# Patient Record
Sex: FEMALE | Race: AMERICAN INDIAN OR ALASKA NATIVE
[De-identification: names, ages, dates, MRNs, and addresses within clinical notes are randomized per-mention and may not be internally consistent; named-entity substitution may affect disease eponyms.]

---

## 2017-12-31 ENCOUNTER — HOSPITAL ENCOUNTER (INPATIENT)
Dept: HOSPITAL 42 - ED | Age: 37
LOS: 2 days | Discharge: HOME | DRG: 812 | End: 2018-01-02
Attending: INTERNAL MEDICINE | Admitting: INTERNAL MEDICINE
Payer: COMMERCIAL

## 2017-12-31 VITALS — BODY MASS INDEX: 25 KG/M2

## 2017-12-31 DIAGNOSIS — N92.0: ICD-10-CM

## 2017-12-31 DIAGNOSIS — D50.9: Primary | ICD-10-CM

## 2017-12-31 DIAGNOSIS — J45.909: ICD-10-CM

## 2017-12-31 DIAGNOSIS — R40.2412: ICD-10-CM

## 2017-12-31 DIAGNOSIS — K59.00: ICD-10-CM

## 2017-12-31 LAB
% IRON SATURATION: 4 % (ref 20–55)
ALBUMIN SERPL-MCNC: 4.1 G/DL (ref 3–4.8)
ALBUMIN/GLOB SERPL: 1.1 {RATIO} (ref 1.1–1.8)
ALT SERPL-CCNC: 18 U/L (ref 7–56)
APPEARANCE UR: CLEAR
APTT BLD: 28.9 SECONDS (ref 25.1–36.5)
AST SERPL-CCNC: 23 U/L (ref 14–36)
BILIRUB UR-MCNC: NEGATIVE MG/DL
BUN SERPL-MCNC: 9 MG/DL (ref 7–21)
CALCIUM SERPL-MCNC: 9.7 MG/DL (ref 8.4–10.5)
COLOR UR: YELLOW
ERYTHROCYTE [DISTWIDTH] IN BLOOD BY AUTOMATED COUNT: 20.3 % (ref 11.5–14.5)
ERYTHROCYTE [DISTWIDTH] IN BLOOD BY AUTOMATED COUNT: 23.4 % (ref 11.5–14.5)
FERRITIN SERPL-MCNC: 3.8 NG/ML
FOLATE SERPL-MCNC: 9.2 NG/ML
GFR NON-AFRICAN AMERICAN: > 60
GLUCOSE UR STRIP-MCNC: NEGATIVE MG/DL
HGB BLD-MCNC: 5.8 G/DL (ref 12–16)
HGB BLD-MCNC: 8.2 G/DL (ref 12–16)
INR PPP: 1.17 (ref 0.93–1.08)
IRON SERPL-MCNC: 17 UG/DL (ref 45–180)
LEUKOCYTE ESTERASE UR-ACNC: NEGATIVE LEU/UL
MCH RBC QN AUTO: 16.5 PG (ref 25–35)
MCH RBC QN AUTO: 19.2 PG (ref 25–35)
MCHC RBC AUTO-ENTMCNC: 26.2 G/DL (ref 31–37)
MCHC RBC AUTO-ENTMCNC: 28.5 G/DL (ref 31–37)
MCV RBC AUTO: 63 FL (ref 80–105)
MCV RBC AUTO: 67.3 FL (ref 80–105)
PH UR STRIP: 6 [PH] (ref 4.7–8)
PLATELET # BLD: 125 10^3/UL (ref 120–450)
PLATELET # BLD: 134 10^3/UL (ref 120–450)
PROT UR STRIP-MCNC: NEGATIVE MG/DL
PROTHROMBIN TIME: 12.8 SECONDS (ref 9.4–12.5)
RBC # BLD AUTO: 3.51 10^6/UL (ref 3.5–6.1)
RBC # BLD AUTO: 4.28 10^6/UL (ref 3.5–6.1)
RBC # UR STRIP: NEGATIVE /UL
SP GR UR STRIP: 1.01 (ref 1–1.03)
TIBC SERPL-MCNC: 399 UG/DL (ref 265–497)
TROPONIN I SERPL-MCNC: 0.06 NG/ML
TROPONIN I SERPL-MCNC: 0.06 NG/ML
TROPONIN I SERPL-MCNC: 0.07 NG/ML
URINE NITRATE: NEGATIVE
UROBILINOGEN UR STRIP-ACNC: 1 E.U./DL
VIT B12 SERPL-MCNC: 277 PG/ML (ref 239–931)
WBC # BLD AUTO: 4.9 10^3/UL (ref 4.5–11)
WBC # BLD AUTO: 6.2 10^3/UL (ref 4.5–11)

## 2017-12-31 PROCEDURE — 30233N1 TRANSFUSION OF NONAUTOLOGOUS RED BLOOD CELLS INTO PERIPHERAL VEIN, PERCUTANEOUS APPROACH: ICD-10-PCS | Performed by: INTERNAL MEDICINE

## 2017-12-31 RX ADMIN — VITAMIN D, TAB 1000IU (100/BT) SCH INTLU: 25 TAB at 18:39

## 2017-12-31 NOTE — ED PDOC
Arrival/HPI





- General


Chief Complaint: Chest Pain


Time Seen by Provider: 12/31/17 02:05


Historian: Patient





- History of Present Illness


Narrative History of Present Illness (Text): 


12/31/17 02:13


Darleen Mauricio is a 37 year old female, whose past medical history includes anemia

, who presents to the Emergency department complaining of chest pain tonight. 

Patient states she began experiencing chest pain radiating to her right, 

worsened with movement, tonight while lying in bed tonight. Patient reports 

associated dizziness and paresthesias to her right arm. Patient denies any 

history of tobacco abuse, fever, shortness of breath, abdominal pain, nausea, 

vomiting, diarrhea, back pain, neck pain, headache, vision changes, or any 

other complaints. 


Time/Duration: Other (tonight)


Symptom Onset: Gradual


Symptom Course: Unchanged


Activities at Onset: Light


Context: Home





Past Medical History





- Provider Review


Nursing Documentation Reviewed: Yes





- Infectious Disease


Hx of Infectious Diseases: None





- Reproductive


Menopause: No





- Cardiac


Hx Cardiac Disorders: No





- Pulmonary


Hx Respiratory Disorders: Yes


Hx Asthma: Yes





- Neurological


Hx Neurological Disorder: Yes


Hx Headaches: Yes





- HEENT


Hx HEENT Disorder: No





- Renal


Hx Renal Disorder: No





- Endocrine/Metabolic


Hx Endocrine Disorders: No





- Hematological/Oncological


Hx Blood Disorders: No





- Integumentary


Hx Dermatological Disorder: No





- Musculoskeletal/Rheumatological


Hx Musculoskeletal Disorders: No


Hx Falls: No





- Gastrointestinal


Hx Gastrointestinal Disorders: Yes


Hx Constipation: Yes





- Genitourinary/Gynecological


Hx Genitourinary Disorders: No





- Psychiatric


Hx Psychophysiologic Disorder: No


Hx Depression: No


Hx Emotional Abuse: No


Hx Physical Abuse: No


Hx Substance Use: No





- Suicidal Assessment


Feels Threatened In Home Enviroment: No





Family/Social History





- Physician Review


Nursing Documentation Reviewed: Yes


Family/Social History: Unknown Family HX


Smoking Status: Never Smoked


Hx Alcohol Use: No


Hx Substance Use: No


Hx Substance Use Treatment: No





Allergies/Home Meds


Allergies/Adverse Reactions: 


Allergies





No Known Allergies Allergy (Verified 12/31/17 01:58)


 








Home Medications: 


 Home Meds











 Medication  Instructions  Recorded  Confirmed


 


No Known Home Med  12/31/17 12/31/17














Review of Systems





- Physician Review


All systems were reviewed & negative as marked: Yes





- Review of Systems


Constitutional: Normal.  absent: Fevers


Eyes: Normal


ENT: Normal


Respiratory: Normal.  absent: SOB, Cough


Cardiovascular: Chest Pain


Gastrointestinal: Normal.  absent: Abdominal Pain


Genitourinary Female: Normal.  absent: Dysuria, Frequency, Urine Output Changes


Musculoskeletal: Other (+paresthesias).  absent: Back Pain


Skin: Normal


Neurological: Dizziness


Endocrine: Normal


Hemo/Lymphatic: Normal


Psychiatric: Normal





Physical Exam


Vital Signs Reviewed: Yes


Vital Signs











  Temp Pulse Resp BP Pulse Ox


 


 12/31/17 01:49  98.4 F  82  17  122/54 L  100











Temperature: Afebrile


Blood Pressure: Normal


Pulse: Regular


Respiratory Rate: Normal


Appearance: Positive for: Well-Appearing, Non-Toxic, Comfortable


Pain Distress: None


Mental Status: Positive for: Alert and Oriented X 3





- Systems Exam


Head: Present: Atraumatic, Normocephalic


Pupils: Present: PERRL


Extroacular Muscles: Present: EOMI


Conjunctiva: Present: Normal


Mouth: Present: Moist Mucous Membranes


Neck: Present: Normal Range of Motion


Respiratory/Chest: Present: Clear to Auscultation, Good Air Exchange.  No: 

Respiratory Distress, Accessory Muscle Use


Cardiovascular: Present: Regular Rate and Rhythm, Normal S1, S2.  No: Murmurs


Abdomen: Present: Normal Bowel Sounds.  No: Tenderness, Distention, Peritoneal 

Signs


Back: Present: Normal Inspection


Upper Extremity: Present: Normal Inspection.  No: Cyanosis, Edema


Lower Extremity: Present: Normal Inspection.  No: Edema


Neurological: Present: GCS=15, CN II-XII Intact, Speech Normal


Skin: Present: Warm, Dry, Normal Color.  No: Rashes


Psychiatric: Present: Alert, Oriented x 3, Normal Insight, Normal Concentration





Medical Decision Making


ED Course and Treatment: 


12/31/17 02:13


Impression:


37 year old female complaining of chest pain and dizziness tonight.





Plan:


-- CT Head w/o contrast


-- EKG


-- Labs, cardiac enzymes


-- Reassess and disposition





Progress Notes:


Reviewed EKG, NSR at 61 bpm. No ST-segment elevations or depressions, no T-wave 

inversions, normal intervals.





12/31/17 03:30


Labs noted, hgb: 5.8. hct: 22.1. Blood type and screen ordered, will transfuse 

pt.





12/31/17 04:40


Case discussed with Dr. Hair, who is aware and agrees with plan. Accepts pt 

in to her service. Pt will go to Telemetry observation for anemia. Requests Dr. Dunn on consult.





12/31/17 05:40


CT Head shows:


No intracranial hemorrhage.


No intracranial edema.


No evidence of infarct.


The sinuses and mastoid air cells are clear.


IMPRESSION:


No acute findings.





- Lab Interpretations


Lab Results: 








 12/31/17 02:00 





 12/31/17 02:00 





 Lab Results





12/31/17 04:19: Blood Type Pending, Antibody Screen Pending, Crossmatch See 

Detail, BBK History Checked Patient has bt


12/31/17 02:00: WBC 4.9, RBC 3.51, Hgb 5.8 L*, Hct 22.1 L, MCV 63.0 L, MCH 16.5 

L, MCHC 26.2 L, RDW 20.3 H, Plt Count 125


12/31/17 02:00: Sodium 137, Potassium 3.7, Chloride 103, Carbon Dioxide 24, 

Anion Gap 14, BUN 9, Creatinine 0.7, Est GFR (African Amer) > 60, Est GFR (Non-

Af Amer) > 60, Random Glucose 102, Calcium 9.7, Total Bilirubin 0.3, AST 23, 

ALT 18, Alkaline Phosphatase 80, Lactate Dehydrogenase 246 L, Total Creatine 

Kinase 39, Troponin I 0.06, Total Protein 7.9, Albumin 4.1, Globulin 3.8, 

Albumin/Globulin Ratio 1.1


12/31/17 02:00: PT 12.8 H, INR 1.17 H, APTT 28.9








I have reviewed the lab results: Yes





- RAD Interpretation


Radiology Orders: 








12/31/17 02:19


HEAD W/O CONTRAST [CT] Stat 











: Radiologist





- EKG Interpretation


Interpreted by ED Physician: Yes


Type: 12 lead EKG





- Scribe Statement


The provider has reviewed the documentation as recorded by the Jessica Roberts





Provider Scribe Attestation:


All medical record entries made by the Scribashlyn were at my direction and 

personally dictated by me. I have reviewed the chart and agree that the record 

accurately reflects my personal performance of the history, physical exam, 

medical decision making, and the department course for this patient. I have 

also personally directed, reviewed, and agree with the discharge instructions 

and disposition.








Disposition/Present on Arrival





- Present on Arrival


Any Indicators Present on Arrival: No


History of DVT/PE: No


History of Uncontrolled Diabetes: No


Urinary Catheter: No


History of Decub. Ulcer: No


History Surgical Site Infection Following: None





- Disposition


Have Diagnosis and Disposition been Completed?: Yes


Diagnosis: 


 Chest pain, Anemia





Disposition: HOSPITALIZED


Disposition Time: 04:49


Patient Problems: 


 Current Active Problems











Problem Status Onset


 


Anemia Acute  


 


Chest pain Acute  











Condition: STABLE

## 2017-12-31 NOTE — CT
EXAM:

  CT Head Without Intravenous Contrast



EXAM DATE/TIME:

  12/31/2017 2:19 AM



CLINICAL HISTORY:

  37 years old, female; Pain; Headache; Additional info: Dizzy



TECHNIQUE:

  Axial computed tomography images of the head/brain without intravenous 

contrast.  All CT scans at this facility use one or more dose reduction 

techniques, viz.: automated exposure control; ma/kV adjustment per patient size 

(including targeted exams where dose is matched to indication; i.e. head); or 

iterative reconstruction technique.

  Coronal and sagittal reformatted images were created and reviewed.



COMPARISON:

  No relevant prior studies available.



FINDINGS:



  No intracranial hemorrhage. 



  No intracranial edema. 



  No evidence of infarct.



  The sinuses and mastoid air cells are clear. 





IMPRESSION:   



  No acute findings.

## 2017-12-31 NOTE — CARD
--------------- APPROVED REPORT --------------





EKG Measurement

Heart [id number]

NC [id number]

[id number]

[id number]

[id number]



<Conclusion>

Normal sinus rhythm

Normal ECG

## 2018-01-01 LAB
BUN SERPL-MCNC: 11 MG/DL (ref 7–21)
CALCIUM SERPL-MCNC: 9.8 MG/DL (ref 8.4–10.5)
ERYTHROCYTE [DISTWIDTH] IN BLOOD BY AUTOMATED COUNT: 22.8 % (ref 11.5–14.5)
FOLATE SERPL-MCNC: 5.3 NG/ML
GFR NON-AFRICAN AMERICAN: > 60
HDLC SERPL-MCNC: 40 MG/DL (ref 29–60)
HGB BLD-MCNC: 8.2 G/DL (ref 12–16)
LDLC SERPL-MCNC: 83 MG/DL (ref 0–129)
MCH RBC QN AUTO: 18.6 PG (ref 25–35)
MCHC RBC AUTO-ENTMCNC: 27.8 G/DL (ref 31–37)
MCV RBC AUTO: 67 FL (ref 80–105)
PLATELET # BLD: 121 10^3/UL (ref 120–450)
RBC # BLD AUTO: 4.4 10^6/UL (ref 3.5–6.1)
TROPONIN I SERPL-MCNC: 0.07 NG/ML
VIT B12 SERPL-MCNC: > 1000 PG/ML (ref 239–931)
WBC # BLD AUTO: 4.6 10^3/UL (ref 4.5–11)

## 2018-01-01 RX ADMIN — VITAMIN D, TAB 1000IU (100/BT) SCH INTLU: 25 TAB at 11:30

## 2018-01-01 NOTE — CP.PCM.PN
Subjective





- Date & Time of Evaluation


Date of Evaluation: 01/01/18


Time of Evaluation: 12:00





- Subjective


Subjective: 





She is comfortable in bed. getting 2 more units of PRBCs as per cardiology. 


denies chest pain. Exertional dyspena . 


Hb improved to 8.2 gm/dl. 








Objective





- Vital Signs/Intake and Output


Vital Signs (last 24 hours): 


 











Temp Pulse Resp BP Pulse Ox


 


 99 F   60   18   113/74   100 


 


 01/01/18 22:38  01/01/18 22:38  01/01/18 22:38  01/01/18 22:38  01/01/18 18:00








Intake and Output: 


 











 01/01/18 01/02/18





 18:59 06:59


 


Intake Total 1220 0


 


Balance 1220 0














- Medications


Medications: 


 Current Medications





Acetaminophen (Tylenol 325mg Tab)  650 mg PO Q6H PRN


   PRN Reason: Fever >100.4 F


   Last Admin: 01/01/18 21:20 Dose:  650 mg


Ascorbic Acid (Vitamin C 500 Mg Tab)  500 mg PO DAILY [facility]


   Last Admin: 01/01/18 11:30 Dose:  500 mg


Cholecalciferol (Vitamin D)  2,000 intlu PO DAILY [facility]


   Last Admin: 01/01/18 11:30 Dose:  2,000 intlu


Cyanocobalamin (Vitamin B12 1000 Mcg Tab)  1,000 mcg PO DAILY [facility]


   Last Admin: 01/01/18 11:30 Dose:  1,000 mcg


Famotidine (Pepcid)  40 mg PO HS [facility]


   Last Admin: 01/01/18 21:20 Dose:  40 mg











- Labs


Labs: 


 





 01/01/18 06:30 





 01/01/18 06:30 





 











PT  12.8 SECONDS (9.4-12.5)  H  12/31/17  02:00    


 


INR  1.17  (0.93-1.08)  H  12/31/17  02:00    


 


APTT  28.9 Seconds (25.1-36.5)   12/31/17  02:00    














- Constitutional


Appears: Well, Non-toxic





- Head Exam


Head Exam: ATRAUMATIC, NORMAL INSPECTION, NORMOCEPHALIC





- Eye Exam


Eye Exam: Normal appearance





- ENT Exam


ENT Exam: Mucous Membranes Moist





- Neck Exam


Neck Exam: Normal Inspection





- Respiratory Exam


Respiratory Exam: Clear to Ausculation Bilateral





- Cardiovascular Exam


Cardiovascular Exam: REGULAR RHYTHM, +S1, +S2





- GI/Abdominal Exam


GI & Abdominal Exam: Soft, Normal Bowel Sounds





- Back Exam


Back Exam: NORMAL INSPECTION





- Neurological Exam


Neurological Exam: Alert, Normal Gait, Oriented x3





- Skin


Skin Exam: Normal Color, Warm





Assessment and Plan





- Assessment and Plan (Free Text)


Assessment: 





1. severe iron deficiency anemia


2. menorragia 


3. SOB





Plan : she is scheduled for cardiac stress test tomorrow. 


2 more PRBC to be given today as per cardiology. 


IV iron as out patient. 


US of uterus to rule out fibroids. .


Work up for possible mild bleeding disorder will be done as out patient. 





Thank you Dr. Hair for allowing us to participate  in her care.

## 2018-01-01 NOTE — HP
CHIEF COMPLAINT:  Chest pain and shortness of breath.



HISTORY OF PRESENT ILLNESS:  Ms. Darleen Mauricio is a 37-year-old female whose

past medical history has anemia, came to the emergency room department

complaining of chest pain tonight.  Patient states that pain began

experiencing in the chest, radiating to her right side, worsening with the

movement tonight while lying down in the bed.  Patient reports associated

dizziness and paresthesia of her right arm.  Patient denies any history of

tobacco abuse, drug abuse, or alcohol abuse.  No fever.  No abdominal pain.

No nausea, vomiting, or diarrhea.  No back pain.  No neck pain.  No

headache.  No vision changes.



PAST MEDICAL HISTORY:  Asthma, headache, constipation.



FAMILY HISTORY:  Father, mother noncontributory.



HABITS:  Never smoked.  No drugs.  No ethanol abuse.



ALLERGIES:  PATIENT IS NOT ALLERGIC WITH ANY MEDICATION.



MEDICATIONS:  Denied, not taking any medications.



REVIEW OF SYSTEMS:  Patient was seen and examined at the bedside.  No

shortness of breath.  No coughing.  History of chest pain, but not feeling

that at the moment when I did the examination. No abdominal pain.  No

dysuria, frequency, urinary output changes.  Has paresthesia.  No back

pain.  No rash.



PHYSICAL EXAMINATION:

VITAL SIGNS:  Temperature 98.4, pulse 82, respiratory rate 17, blood

pressure 122/54.

HEENT:  Head:  Normocephalic and atraumatic.  Eyes:  PERRLA.  Extraocular

muscles intact.  Conjunctivae clear.  Nose:  Patent.  Mucous membranes

moist.

NECK:  Supple.  No carotid bruits.  No JVD or thyromegaly.

CHEST: Bilaterally symmetrical.

HEART:  S1, S2 positive.

LUNGS:  Clear to auscultation.

ABDOMEN:  Soft.  Bowel sounds positive.  No organomegaly.

EXTREMITIES:  No edema.  No cyanosis.

NEUROLOGIC:  Patient is sleepy, arousable.  Moving all four extremities. 

No focal deficit.



LABORATORY DATA:  White blood cells 4.9, hemoglobin 5.8, hematocrit 22.1,

platelet 125.  Sodium 137, potassium 3.7, BUN 9, creatinine 0.7, glucose

102.



ASSESSMENT AND PLAN:  Ms. Darleen Mauricio, a 37-year-old lady, came with

severe anemia, symptomatic; chest pain; dizziness; seen by the hematologist

Dr. Dunn.  Packed red blood cells transfused.  Chest pain and dizziness

improved.  Admission hemoglobin was 5.8; after transfusion, it is 8.2. 

Patient has iron deficiency.  We will order cardiac enzymes x3.  Waiting

for Dr. Cordon's input.  Meanwhile, continue present treatment. 

Gastrointestinal and deep vein thrombosis prophylaxis.  Repeat labs.  We

will follow.





__________________________________________

Ellen Hair MD



DD:  12/31/2017 17:00:52

DT:  01/01/2018 0:12:17

Job # [id number]

## 2018-01-01 NOTE — CON
DATE:  01/01/2018



REASON FOR CONSULTATION:  Followup chest pain, shortness of breath, cardiac

evaluation.



BRIEF CLINICAL HISTORY:  This is a 37-year-old female with past medical

history significant for anemia, history of heavy periods, came in with

complaint of shortness of breath for a couple of days and chest pain,

left-sided which is very tender and reproducible.  Denies any prior episode

of chest pain, but complain shortness of breath and dizziness on exertion.



PAST MEDICAL HISTORY:  Significant for anemia, heavy menstrual period,

constipation, headache, and asthma.



FAMILY HISTORY:  No history of coronary artery disease.



SOCIAL HISTORY:  Denies any smoking.  Denies any history of alcohol abuse. 

The patient worked as  mostly busy on the telephone.



ALLERGIES:  NO KNOWN DRUG ALLERGIES.



CURRENT MEDICATIONS:  Denies any medication at home.



REVIEW OF SYSTEMS:  As per HPI.



PHYSICAL EXAMINATION:  As follows:

VITAL SIGNS:  Temperature afebrile, heart rate 60, blood pressure 90/50.

HEENT:  PERRLA.  Extraocular muscles intact.

NECK:  Supple.  No carotid bruit or thyromegaly.

CHEST:  Clear to auscultation.

HEART:  S1 and S2 regular.

ABDOMEN:  Soft.

EXTREMITIES:  Clubbing and cyanosis negative.



LABORATORY DATA:  Blood workup as follows:  WBC 4.6, hemoglobin 8.8,

hematocrit 29.5, platelet count 121.  Admitting hemoglobin 5.8, sodium 130,

potassium 3.0, chloride 106, carbon dioxide 23, anion gap of 12, BUN 11,

creatinine 0.7.  EKG showed normal sinus rate of 61.



IMPRESSION:  Atypical chest pain, tenderness in the chest, shortness of

breath secondary to anemia, but troponin most likely secondary to

hemodynamic instability.



RECOMMENDATIONS:  We will unit of packed RBC and we will just for risk

stratification do the stress test tomorrow.  Discuss with the patient.  I

will get lipid profile, TSH, and hemoglobin A1c.



Thank you Dr. Hair for providing us the opportunity in taking care of the

patient.  Keep hemoglobin around 10.  Positive troponin of indeterminate

range _____ most likely secondary to anemia, but cannot rule out underlying

coronary artery disease do unlikely.  So we will give 2 units of blood and

if hemoglobin goes around 10 we will do stress test tomorrow.  We will get

the stress test tomorrow after the hemoglobin reaches to 10.





__________________________________________

Ondina Cordon MD





DD:  01/01/2018 10:37:19

DT:  01/01/2018 13:19:50

Job # [id number]

## 2018-01-01 NOTE — CON
DATE:  12/31/2017



CONSULT REQUESTED BY:  Ellen Hair MD



REASON FOR CONSULTATION:  Severe anemia.



HISTORY OF PRESENT ILLNESS:  Mrs. Mauricio is a 37-year-old female admitted to

the hospital with shortness of breath and dizziness.  Hemoglobin was found

to be 5.8 g/dL.  She has history of menorrhagia.  No history of bleeding

from any other site.  She had low hemoglobin previously also, has not

required blood transfusion.  Denies any family history of thalassemia or

sickle cell trait.  No dark colored stools.  No abdominal pain.  She has

history of headaches in the past.  No exacerbation recently.



PAST MEDICAL HISTORY:  Asthma, menorrhagia, headaches.  Constipation.



PAST SURGICAL HISTORY:  None.



FAMILY HISTORY:  Noncontributory.  No positive history in mother or father.



PERSONAL HISTORY:  Never smoked, no history of alcohol abuse.



SOCIAL HISTORY:  Lives at home.



ALLERGIES:  NO KNOWN DRUG ALLERGIES.



HOME MEDICATIONS:  None.



REVIEW OF SYSTEMS:   As per HPI.  Rest of 12-point review of systems

reviewed negative.



PHYSICAL EXAMINATION:

GENERAL:  Comfortable in bed, in no acute distress.

VITAL SIGNS:  Temperature is 98.4, heart rate is 82, respiratory rate is 17

per minute, blood pressure is 122/54, pulse ox is 100% on room air.

HEENT:  Pallor positive.

NECK:  Supple.

CHEST:  Air entry present and equal bilaterally.

CARDIOVASCULAR:  Tachycardia plus, S1 and S2 normal.  No murmur and no

gallop.

ABDOMEN:  Soft and nontender.  No hepatosplenomegaly.

EXTREMITIES:  No edema.

CNS:  Alert and oriented x3.  No focal sensory or motor deficit.



IMAGING:  CAT scan of the head, no acute findings.



LABS:  White count 4.9, hemoglobin 5.8, hematocrit 22, platelet count 125. 

Sodium 137, potassium 3.7, BUN 9, creatinine 0.7, glucose 102.  .



ASSESSMENT AND PLAN:

1.  Severe anemia.

2.  Menorrhagia.

3.  History of headaches.



PLAN:  Iron studies ordered to be done stat, showed low iron of 17,

ferritin of 4 only.  She has severe iron deficiency anemia likely related

to menorrhagia blood loss.  No other signs of blood loss as per history. 

MCV is lower, 67, consistent with iron deficiency anemia.  No family

history of thalassemia or sickle cell trait.  She received 2 units of blood

transfusion today.  I ordered one more unit of blood transfusion.  Repeat

CBC after transfusion.  If hemoglobin is less  than 8.0 gm/dl,  she might need 
more PRBCs. 

 She will need  IV iron,  which can be done

in the office upon discharge from the hospital.



Thank you Dr. Hair for allowing us to participate in Mrs. Mauricio's care.







__________________________________________

Agueda Dunn MD



DD:  01/01/2018 12:17:16

DT:  01/01/2018 13:50:26

Job # [id number]









[id number]

## 2018-01-02 VITALS — TEMPERATURE: 97.8 F | SYSTOLIC BLOOD PRESSURE: 113 MMHG | OXYGEN SATURATION: 97 % | DIASTOLIC BLOOD PRESSURE: 72 MMHG

## 2018-01-02 VITALS — HEART RATE: 70 BPM

## 2018-01-02 VITALS — RESPIRATION RATE: 20 BRPM

## 2018-01-02 LAB
ALBUMIN SERPL-MCNC: 4.2 G/DL (ref 3–4.8)
ALBUMIN/GLOB SERPL: 0.9 {RATIO} (ref 1.1–1.8)
ALT SERPL-CCNC: 29 U/L (ref 7–56)
AST SERPL-CCNC: 26 U/L (ref 14–36)
BASOPHILS # BLD AUTO: 0.03 K/MM3 (ref 0–2)
BASOPHILS NFR BLD: 0.5 % (ref 0–3)
BUN SERPL-MCNC: 11 MG/DL (ref 7–21)
CALCIUM SERPL-MCNC: 10 MG/DL (ref 8.4–10.5)
EOSINOPHIL # BLD: 0.1 10*3/UL (ref 0–0.7)
EOSINOPHIL NFR BLD: 2.1 % (ref 1.5–5)
ERYTHROCYTE [DISTWIDTH] IN BLOOD BY AUTOMATED COUNT: 24.7 % (ref 11.5–14.5)
GFR NON-AFRICAN AMERICAN: > 60
GRANULOCYTES # BLD: 3.27 10*3/UL (ref 1.4–6.5)
GRANULOCYTES NFR BLD: 49.6 % (ref 50–68)
HGB BLD-MCNC: 11 G/DL (ref 12–16)
LYMPHOCYTES # BLD: 2.6 10*3/UL (ref 1.2–3.4)
LYMPHOCYTES NFR BLD AUTO: 39.8 % (ref 22–35)
MAGNESIUM SERPL-MCNC: 1.9 MG/DL (ref 1.7–2.2)
MCH RBC QN AUTO: 21.1 PG (ref 25–35)
MCHC RBC AUTO-ENTMCNC: 30 G/DL (ref 31–37)
MCV RBC AUTO: 70.4 FL (ref 80–105)
MONOCYTES # BLD AUTO: 0.5 10*3/UL (ref 0.1–0.6)
MONOCYTES NFR BLD: 8 % (ref 1–6)
PLATELET # BLD: 140 10^3/UL (ref 120–450)
RBC # BLD AUTO: 5.21 10^6/UL (ref 3.5–6.1)
WBC # BLD AUTO: 6.6 10^3/UL (ref 4.5–11)

## 2018-01-02 RX ADMIN — VITAMIN D, TAB 1000IU (100/BT) SCH: 25 TAB at 12:27

## 2018-01-02 NOTE — CARD
--------------- APPROVED REPORT --------------





EXAM: Two-dimensional and M-mode echocardiogram with Doppler and 

color Doppler.



INDICATION

LV Function:SystolicDiastolic Chest Pain 



2D DIMENSIONS 

Left Atrium (2D)3.8   (1.6-4.0cm)IVSd0.9   (0.7-1.1cm)

LVDd4.3   (3.9-5.9cm)PWd0.9   (0.7-1.1cm)

LVDs2.8   (2.5-4.0cm)FS (%) 35.7   %

LVEF (%)65.5   (>50%)



M-Mode DIMENSIONS 

Aortic Root2.70   (2.2-3.7cm)Aortic Cusp Exc.1.80   (1.5-2.0cm)



Aortic Valve

AoV Peak Tdbijppw836.0cm/Alexx Peak GR.8mmHg



Mitral Valve

MV E Dltxsaiy12.6cm/sMV A Apxnqbfx25.3cm/sE/A ratio1.3



TDI

Lateral E' Peak V12.10cm/sMedial E' Peak V9.36cm/sE/Lateral E'5.9

E/Medial E'7.6



Pulmonary Valve

PV Peak Luaowfoj97.1cm/sPV Peak Grad.2mmHg



Tricuspid Valve

TR Peak Qpdclzll614cm/sRAP UBTAZKEQ85knEgLA Peak Gr.24mmHg

[id number]



 LEFT VENTRICLE 

The left ventricle is normal size. There is normal left ventricular 

wall thickness. The left ventricular function is normal.EF-55-60% 

There is normal LV segmental wall motion. The left ventricular 

diastolic function is normal. No left ventricle thrombus noted on 

this study. There is no ventricular septal defect visualized. There 

is no left ventricular aneurysm. There is no mass noted in the left 

ventricle.



 RIGHT VENTRICLE 

The right ventricle is normal size. There is normal right ventricular 

wall thickness. The right ventricular systolic function is normal.



 ATRIA 

The left atrium size is normal. The right atrium size is normal. The 

interatrial septum is intact with no evidence for an atrial septal 

defect.



 AORTIC VALVE 

The aortic valve is thickened but opens well. There is trace aortic 

regurgitation. There is no aortic valvular stenosis. There is no 

aortic valvular vegetation.



 MITRAL VALVE 

The mitral valve is thickened but opens well. Mitral regurgitation is 

mild. There is no mitral valve stenosis. There is no evidence of 

mitral valve prolapse.



 TRICUSPID VALVE 

The tricuspid valve leaflets are thickened , but open well. There is 

mild tricuspid regurgitation.RVSP-34 mmof Hg. There is no tricuspid 

valve stenosis. There is no tricuspid valve prolapse or vegetation.



 PULMONIC VALVE 

The pulmonic valve is mildly thickened. There is no pulmonic valvular 

regurgitation. There is no pulmonic valvular stenosis.



 GREAT VESSELS 

The aortic root is normal in size. The ascending aorta is normal in 

size. The pulmonary artery is normal. The IVC is normal in size and 

collapses >50% with inspiration.



 PERICARDIAL EFFUSION 

There is no pleural effusion. There is no pericardial effusion.



<Conclusion>

Normal chamber Size. EF-55-60%

There is trace aortic regurgitation.

Mitral regurgitation is mild.

There is mild tricuspid regurgitation.RVSP-34 mmof Hg.

No Vegetation noted.

## 2018-01-02 NOTE — CARD
--------------- APPROVED REPORT --------------





Protocol: LEXISCAN

Test Type: Lexiscan Sestamibi Stress Test

Attending Physician: Dr. Ondina Winter

Referring Physician: Dr. Ellen Hair  

Test Indications: Chest Pain

Height:5 ft  4  in

Weight:161lbs 

Medications: TYLENOL, VITAMIN c, vITAMIN B 12, VITAMIN D, 

PEPCID

Medical History: 37 YEAR OLD FEMALE WITH A H/O  ASTHMA, 

CONSTIPATION AND HEADACHES

Target HR: 185 bpm

Resting ECG: RSR. ST_T Changes.

Resting Heart Rate: 59 bpm

Resting Blood Pressure: 74/80mmHg

Submaximum (85%): 157 bpm



PROCEDURE

Pharmacologic stress testing was performed using 0.4mg per 5ml of 

regadenoson given intravenously over 7-10 seconds.

  Reversal agent aminophyline 150 mg, given intravenously for 

Dizziness.



POST EXERCISE

Reason for Termination: Protocol completed

Target HR: No

Max HR: 70 bpm

63% of Maximum Predicted HR: 185 bpm

Exercise duration: 00:32 min:sec, 0 Stage

Exercise capacity: 1.0METs

Max Blood Pressure: 124/78mmHg

Blood Pressure response to exercise: normal resting BP - appropriate 

response

Heart Rate response to exercise: appropriate

Chest Pain: No, none

Angina index: 0

Arrhythmia: No, none

ST Change: No, none

Deviation: 0 mm



INTERPRETATION

Stress EKG Conclusion: STRESS TEST CHANGED TO IV LEXISCAN NUCLEAR 

STRESS TEST AS PATIENT COULD NO ACHIEVE ADEQUATE HEART RATE  ON 

TREADMIL BECAUSE OF SHORTNESS OF BREATH. IV LEXISCAN NUCLEAR STRESS 

TEST NEGATIVE FOR CHEST PAIN AND NEGATIVE FOR ADDITIONAL ST-T CHANGES.







NUCLEAR SCAN REPORT PENDING.



Signed by  Ondina Winter

Electronically Approved: 01/02/2018 11:43:03



EXAM: Myocardial Perfusion REST/STRESS

Stress Test Type: Pharmacologic



Imaging Protocol

Rest Spect myocardial perfusion imaging was performed in supine 

position 50 minutes following the injection of 10.3 mCi of Tc-99 

Myoview.

At peak stress, the patient was injected intravenously with 30.9mCi 

of Tc-99 tetrofosmin after an infusion time of 0 minutes and 10 

seconds.

Gated Stress Spect was performed 65 minutes after intravenous Tc-99 

Myoview injection.

The images were gated to evaluate regional wall motion and calculate 

ventricular ejection fraction.Images were reconstructed using 

backfilter projection method in short horizontal and verticle long 

axis. Spect slices were generated.



LV Perfusion

The quality of the study is good. The left ventricle is normal in 

size. The right ventricle is unremarkable. The lung uptake is normal. 

The distribution of tracer reveals mildly to moderately decreased 

perfusion involving mid to distal anterior and apical walls  on the 

stress study. The remainder of the LV myocardium is unremarkable.  

The rest myocardial perfusion study shows no significant change.



Wall Motion

Wall motion study shows good contractility of the left ventricle.  

LVEF = 55%.



Conclusion

1. Essentially normal SPECT myocardial perfusion study.

2. Fixed, anterior and apical defects are  most likely due to breast 

attenuation.

3. Normal gated wall motion of the left ventricle.

## 2018-01-02 NOTE — PN
DATE:



SUBJECTIVE:  The patient is seen and examined at the bedside, looking

comfortable.  No nausea, vomiting, or diarrhea.   and daughter is

sitting on the bedside, getting blood transfusion.  No shortness of breath,

sometimes still getting chest pain.



PHYSICAL EXAMINATION

VITAL SIGNS:  Temperature 98.4, heart rate 82, respiratory rate 17, blood

pressure 122/54, pulse oximetry 100% on room air.

HEENT:   Head:  Normocephalic and atraumatic.  Eyes:  PERRLA.  Extraocular

muscles intact.  Conjunctivae clear.  Nose patent.  Mucous membranes moist.

NECK:  Supple.  No carotid bruits.  No JVD or thyromegaly.

CHEST:  Bilaterally symmetrical.

HEART:  S1 and S2 positive.

LUNGS:  Clear to auscultation.

ABDOMEN:  Soft and nontender.  No organomegaly.

EXTREMITIES:  No edema.  No cyanosis.

NEUROLOGIC:  The patient is oriented x3.   No focal deficit.



LABORATORY DATA:   White blood cells 4.9, hemoglobin 5.8, hematocrit 22,

platelets 125.  Sodium 137, potassium 3.7, BUN 9, creatinine 0.7, glucose

102, .



ASSESSMENT AND PLAN:  Ms. Darleen Mauricio has severe anemia, status post blood

transfusion and menorrhagia due to fibroid uterus, history of headache.

chest pain, iron studies ordered.  The patient has iron deficiency, getting

iron infusion.  The patient is educated to go to see her OB/GYN for

menorrhagia.  Cardiologist is on the case.  Hematologist is on the case. 

Appreciated input of Dr. Dunn.  CAT scan of the head is done, reviewed by

me.  The patient has history of asthma, constipation.  Gastrointestinal and

deep venous thrombosis prophylaxis.  Repeat labs.  We will follow.





__________________________________________

Ellen Hair MD



DD:  01/01/2018 19:49:29

DT:  01/01/2018 20:53:41

Job # [id number]

## 2018-01-02 NOTE — PN
REASON FOR CONSULTATION AND FOLLOWUP:  Chest pain and shortness of breath,

cardiac evaluation, severe anemia, status post 4 units of packed RBC

transfusion.



SUBJECTIVE:  The patient denies any chest pain, but still feels tenderness

on the chest.



OBJECTIVE:

GENERAL:  Not in apparent distress.  Feels  on the chest.

VITAL SIGNS:  Temperature afebrile, heart rate 50, blood pressure 105/71.

HEENT:  PERRLA.  Extraocular muscles intact.

NECK:  Supple.  No carotid bruits or thyromegaly.

CHEST:  Clear to auscultation.

HEART:  S1 and S2 regular.

ABDOMEN:  Soft.

EXTREMITIES:  Clubbing and cyanosis negative.



LABORATORY DATA:  WBC 6.6, hemoglobin 11, hematocrit 36.7, platelet count

140.  Chemistry shows sodium 140, potassium 3.5, chloride 103, carbon

dioxide 24, anion gap of 17, BUN 11, creatinine 0.7.



IMPRESSION:  Atypical chest pain, so far no evidence of acute myocardial

infarction, severe anemia status post 4 units of packed RBC transfusion. 

Last time nurse called that patient complained of chest pain, which is very

tender.  The patient's temperature is 99, so 2 Tylenol given, 2 units of

packed RBC was transfused.  This morning, hemoglobin is 11, potassium 4. 

The patient is cleared for a stress test today.  Further recommendation

after the stress test and echo.  We will follow with you.  The patient is

okay to be discharged after the stress test.  Follow up stress as

outpatient.  We will supplement potassium.



Thank you Dr. Hair for providing us the opportunity in taking care of the

patient Darleen Mauricio.







__________________________________________

Ondina Cordon MD





DD:  01/02/2018 12:31:54

DT:  01/02/2018 13:04:11

Job # [id number]

## 2018-01-05 NOTE — DS
CHIEF COMPLAINT:  Chest pain and shortness of breath.



HISTORY OF PRESENT ILLNESS:  Ms. Darleen Mauricio is a 37-year-old female with

past medical history of anemia who came to the Emergency Department

complaining of chest pain.  The patient states that pain began experiencing

in the chest radiating to her right side, worsening with the movement. 

While lying down in the bed, the patient reports associated dizziness and

paraesthesia of the right arm.  The patient denies any history of tobacco

use, drug abuse or alcohol abuse.  No fever.  No chills.  No abdominal

pain.  No nausea or vomiting.  No diarrhea.  No hematuria, no hematochezia.

We admitted the patient, did CAT scan of the head, stress test was done,

echocardiography done.  The patient was seen by Dr. Cordon, cardiologist and

Dr. Agueda Dunn, hematologist.  The patient improved.  Cardiologist cleared

the patient for discharge.  Got blood transfusion, improved, chest pressure

has gone.  Discharged by a friend, follow up in my office, prescriptions,

medications and vitamins given.  Even in the hospital, vitamin was

injected.



PAST MEDICAL HISTORY:  Asthma, headache, and constipation.



FAMILY HISTORY:  Father and mother noncontributory.



HABITS:  Never smoked.  No drug, no ethanol abuse.



ALLERGIES:  THE PATIENT IS NOT ALLERGIC WITH ANY MEDICATIONS.



HOME MEDICATIONS:  Denied.



REVIEW OF SYSTEMS:  The patient is seen and examined at the bedside,

looking comfortable.  No nausea, vomiting or diarrhea.  No hematuria, no

hematochezia.  No swelling of the legs.  No chest pain.  No headache, no

dizziness.  No shortness of breath.  The patient got 4 units of packed RBCs

plus iron infusion, and B12 injections.  Cardiac evaluation was done.



PHYSICAL EXAMINATION:

VITAL SIGNS:  Temperature 98.6, heart rate 58, blood pressure 105/71, and

respiratory rate 20.

HEENT:  Head normocephalic and atraumatic.  Eyes:  PERRLA.  Extraocular

muscles intact.  Conjunctivae clear.  Nose patent.  Mucous membranes moist.

NECK:  Supple.  No carotid bruits.  No JVD or thyromegaly.

CHEST:  Bilaterally symmetrical.

HEART:  S1 and S2 positive.

LUNGS:  Clear to auscultation.

ABDOMEN:  Soft.  Bowel sounds positive.  No organomegaly.

EXTREMITIES:  No edema.  No cyanosis.

NEUROLOGIC:  The patient is awake and alert.  Moving all four extremities. 

No focal deficits.



LABORATORY DATA:  White blood cells 6.6, hemoglobin 11, hematocrit 36.7,

and platelets 140.  Sodium 140, potassium 3.5, BUN 11, and creatinine 0.7.



ASSESSMENT AND PLAN:  Ms. Darleen Mauricio is a 37-year-old female _____ chest

pain, no evidence of acute myocardial infarction, severe anemia status post

4 units packed RBC transfusion.  The patient complained of chest pain on

which she was very tender.  The patient has temperature of 99 so two

Tylenol given.  Four units packed RBCs transfused.  Hemoglobin increased to

11.  The patient cleared for stress test today.  Went for stress test.  The

patient will be able to discharged after stress test because the patient

was cleared by the cardiologist and hematologist.  GI and DVT prophylaxis. 

Repeat labs.  We will follow up.





__________________________________________

Ellen Hair MD



DD:  01/04/2018 22:38:15

DT:  01/05/2018 1:54:41

Job # [id number]